# Patient Record
Sex: FEMALE | Race: WHITE | NOT HISPANIC OR LATINO | Employment: FULL TIME | URBAN - METROPOLITAN AREA
[De-identification: names, ages, dates, MRNs, and addresses within clinical notes are randomized per-mention and may not be internally consistent; named-entity substitution may affect disease eponyms.]

---

## 2021-05-11 ENCOUNTER — APPOINTMENT (OUTPATIENT)
Dept: RADIOLOGY | Facility: CLINIC | Age: 21
End: 2021-05-11
Payer: COMMERCIAL

## 2021-05-11 ENCOUNTER — OFFICE VISIT (OUTPATIENT)
Dept: URGENT CARE | Facility: CLINIC | Age: 21
End: 2021-05-11
Payer: COMMERCIAL

## 2021-05-11 VITALS
TEMPERATURE: 97.8 F | HEIGHT: 66 IN | RESPIRATION RATE: 18 BRPM | WEIGHT: 136 LBS | OXYGEN SATURATION: 98 % | DIASTOLIC BLOOD PRESSURE: 57 MMHG | BODY MASS INDEX: 21.86 KG/M2 | SYSTOLIC BLOOD PRESSURE: 113 MMHG | HEART RATE: 73 BPM

## 2021-05-11 DIAGNOSIS — S99.921A FOOT INJURY, RIGHT, INITIAL ENCOUNTER: ICD-10-CM

## 2021-05-11 DIAGNOSIS — M77.41 METATARSALGIA OF RIGHT FOOT: Primary | ICD-10-CM

## 2021-05-11 PROCEDURE — 73630 X-RAY EXAM OF FOOT: CPT

## 2021-05-11 PROCEDURE — 99204 OFFICE O/P NEW MOD 45 MIN: CPT | Performed by: PHYSICIAN ASSISTANT

## 2021-05-11 NOTE — PROGRESS NOTES
[facility]        NAME: Sandi Bacon is a 19 y o  female  : 2000    MRN: [id number]  DATE: May 11, 2021  TIME: 4:13 PM    Assessment and Plan   Metatarsalgia of right foot [M77 41]  1  Metatarsalgia of right foot  XR foot 3+ vw right    Ambulatory referral to Podiatry         Patient Instructions      follow-up with Podiatry as needed  Follow up with PCP in 3-5 days  Proceed to  ER if symptoms worsen  Chief Complaint     Chief Complaint   Patient presents with    Foot Pain     Has a previous Broken middle toe that she feels she broke multiple times in the past year and know having pain in the [place] and padof her foot           History of Present Illness        64-year-old female presents with right foot pain  Patient reports she has been having discomfort for the past several days  No trauma his reported recently  Reports that she is having lot of pain on the plantar aspect of her foot down by her toes  Denies any swelling or erythema  Previous history of broken toes the past year  Ankle Pain   The incident occurred 3 to 5 days ago  The incident occurred at home  There was no injury mechanism  The pain is present in the right foot  The quality of the pain is described as aching  The pain is mild  The pain has been fluctuating since onset  Pertinent negatives include no inability to bear weight, loss of motion, loss of sensation, muscle weakness, numbness or tingling  She reports no foreign bodies present  The symptoms are aggravated by palpation and weight bearing  She has tried nothing for the symptoms  The treatment provided no relief  Review of Systems   Review of Systems   Constitutional: Negative  Respiratory: Negative  Cardiovascular: Negative  Gastrointestinal: Negative  Musculoskeletal: Positive for arthralgias  Skin: Negative  Neurological: Negative  Negative for tingling and numbness           Current Medications     No current outpatient medications on file     Current Allergies     Allergies as of 05/11/2021    (No Known Allergies)            The following portions of the patient's history were reviewed and updated as appropriate: allergies, current medications, past family history, past medical history, past social history, past surgical history and problem list      Past Medical History:   Diagnosis Date    Allergic     Seasonal     MTHFR gene mutation        Past Surgical History:   Procedure Laterality Date    WISDOM TOOTH EXTRACTION         Family History   Problem Relation Age of Onset    Motor neuron disease Mother     No Known Problems Father          Medications have been verified  Objective   /57   Pulse 73   Temp 97 8 °F (36 6 °C)   Resp 18   Ht 5' 6" (1 676 m)   Wt 61 7 kg (136 lb)   SpO2 98%   BMI 21 95 kg/m²   No LMP recorded  Physical Exam     Physical Exam  Vitals signs and nursing note reviewed  Constitutional:       General: She is not in acute distress  Appearance: She is well-developed  HENT:      Head: Normocephalic and atraumatic  Right Ear: External ear normal       Left Ear: External ear normal       Nose: Nose normal       Mouth/Throat:      Pharynx: No oropharyngeal exudate  Eyes:      General:         Right eye: No discharge  Left eye: No discharge  Conjunctiva/sclera: Conjunctivae normal    Neck:      Musculoskeletal: Normal range of motion and neck supple  Pulmonary:      Effort: Pulmonary effort is normal  No respiratory distress  Musculoskeletal: Normal range of motion  Right foot: Normal range of motion and normal capillary refill  Tenderness and bony tenderness present  No swelling, crepitus or deformity  Feet:    Skin:     General: Skin is warm and dry  Neurological:      Mental Status: She is alert and oriented to person, place, and time  x-rays reviewed  No fractures or abnormalities noted    Pending radiologist interpretation

## 2021-05-11 NOTE — PATIENT INSTRUCTIONS
Motrin and/or Tylenol as needed for pain control   follow-up with Podiatry as needed  Follow up with PCP in 3-5 days  Proceed to  ER if symptoms worsen  Metatarsalgia   AMBULATORY CARE:   Metatarsalgia  is pain in the ball of your foot, near your second, third, and fourth toes  Common signs and symptoms of metatarsalgia:  Symptoms usually develop over time, but you may have sudden pain from an injury  You may have any of the following:  · Pain at the ball of your foot or near your toes that gets worse when you walk or stand, especially on hard surfaces    · Pain during exercises such as running    · Sharp or shooting pain in your toes that may get worse when you flex your toes    · Tingling or numbness in your toes    · Feeling like you are walking over rocks, or that you have a bruise    · A change in the way you walk because you try to avoid putting pressure on the ball of your foot    Contact your healthcare provider if:   · You develop knee, back, or hip pain  · You have more pain or redness in the foot  · You have questions or concerns about your condition or care  Treatment:  The cause of your metatarsalgia will be treated, if possible  You may also need any of the following:  · NSAIDs , such as ibuprofen, help decrease swelling, pain, and fever  This medicine is available with or without a doctor's order  NSAIDs can cause stomach bleeding or kidney problems in certain people  If you take blood thinner medicine, always ask if NSAIDs are safe for you  Always read the medicine label and follow directions  Do not give these medicines to children under 10months of age without direction from your child's healthcare provider  · Ultrasound  may be used to relieve your pain  Sound waves from the ultrasound can help send heat deeper into your tissues  · A steroid injection  may help decrease inflammation  · Surgery  may be needed if other treatments do not work   Surgery is used to align the bones near your toes  You may also need surgery to fix a problem such as hammertoe  Manage or prevent metatarsalgia:   · Rest your foot  If you play sports, you may not be able to do weight-bearing exercises  Examples include swimming and bike riding  Ask your healthcare provider which exercises are safe for you  · Apply ice as directed  Ice helps reduce pain and swelling  Use an ice pack, or put crushed ice in a plastic bag  Cover the pack or bag with a towel before you apply it to your foot  Apply ice for 15 to 20 minutes every hour, or as directed  · Use a cane or crutch if directed  These devices may help take pressure off your foot while it heals  · Wear proper shoes  Do not wear shoes that are narrow or tight  You may need to wear shoes that are wider than you usually wear  Choose shoes that do not have a raised heel  Shock-absorbing shoes can help prevent injury  These shoes will have extra support under your feet and toes  You can also add shoe cushions inside your shoes or to the bottoms of your feet, near your toes  The cushions may provide more support and make walking or standing more comfortable  Arch supports may help take pressure off your toes  · Reach or maintain a healthy weight  Extra weight can put pressure on your feet  Talk to your healthcare provider about a healthy weight for you  Your provider can help you create a safe weight loss plan if you are overweight  · Go to physical therapy if directed  A physical therapist can help improve your strength and range of motion  The therapist can also help you improve the way you walk to prevent metatarsalgia from happening again  Your therapist can also teach you exercises to help relieve your pain  Follow up with your healthcare provider as directed:  Write down your questions so you remember to ask them during your visits     © Copyright [facility] [address] Information is for End User's use only and may not be sold, redistributed or otherwise used for commercial purposes  All illustrations and images included in CareNotes® are the copyrighted property of A D A M , Inc  or Brian Urias  The above information is an  only  It is not intended as medical advice for individual conditions or treatments  Talk to your doctor, nurse or pharmacist before following any medical regimen to see if it is safe and effective for you

## 2021-06-03 ENCOUNTER — OFFICE VISIT (OUTPATIENT)
Dept: PODIATRY | Facility: CLINIC | Age: 21
End: 2021-06-03
Payer: COMMERCIAL

## 2021-06-03 VITALS
WEIGHT: 135 LBS | HEIGHT: 66 IN | HEART RATE: 84 BPM | DIASTOLIC BLOOD PRESSURE: 71 MMHG | SYSTOLIC BLOOD PRESSURE: 117 MMHG | BODY MASS INDEX: 21.69 KG/M2

## 2021-06-03 DIAGNOSIS — G57.61 LESION OF RIGHT PLANTAR NERVE: Primary | ICD-10-CM

## 2021-06-03 DIAGNOSIS — R60.0 LOCALIZED EDEMA: ICD-10-CM

## 2021-06-03 DIAGNOSIS — M77.41 METATARSALGIA OF RIGHT FOOT: ICD-10-CM

## 2021-06-03 PROCEDURE — 64455 NJX AA&/STRD PLTR COM DG NRV: CPT | Performed by: PODIATRIST

## 2021-06-03 PROCEDURE — 99202 OFFICE O/P NEW SF 15 MIN: CPT | Performed by: PODIATRIST

## 2021-06-03 RX ORDER — MELOXICAM 15 MG/1
15 TABLET ORAL DAILY
Qty: 30 TABLET | Refills: 0 | Status: SHIPPED | OUTPATIENT
Start: 2021-06-03 | End: 2021-07-22

## 2021-06-03 RX ORDER — LIDOCAINE HYDROCHLORIDE 10 MG/ML
1 INJECTION, SOLUTION EPIDURAL; INFILTRATION; INTRACAUDAL; PERINEURAL ONCE
Status: COMPLETED | OUTPATIENT
Start: 2021-06-03 | End: 2021-06-03

## 2021-06-03 RX ORDER — TRIAMCINOLONE ACETONIDE 40 MG/ML
20 INJECTION, SUSPENSION INTRA-ARTICULAR; INTRAMUSCULAR ONCE
Status: COMPLETED | OUTPATIENT
Start: 2021-06-03 | End: 2021-06-03

## 2021-06-03 RX ORDER — LEVOCETIRIZINE DIHYDROCHLORIDE 5 MG/1
TABLET, FILM COATED ORAL
COMMUNITY

## 2021-06-03 RX ADMIN — LIDOCAINE HYDROCHLORIDE 1 ML: 10 INJECTION, SOLUTION EPIDURAL; INFILTRATION; INTRACAUDAL; PERINEURAL at 16:01

## 2021-06-03 RX ADMIN — TRIAMCINOLONE ACETONIDE 20 MG: 40 INJECTION, SUSPENSION INTRA-ARTICULAR; INTRAMUSCULAR at 16:02

## 2021-06-03 NOTE — PROGRESS NOTES
Assessment/Plan:      Explained to patient that she is dealing with metatarsalgia of the right foot due to possible neuroma  Unable to rule out joint pain secondary to contusion that caused her 3rd toe fracture in the past     Injected 3rd metatarsal interspace right foot with 0 5 cc Kenalog 40 along with 1 cc 1% xylocaine  Patient placed on meloxicam 15 mg daily  In the future, recommend cortisone injection at base of right 3rd toe to try to reduce the edema  No problem-specific Assessment & Plan notes found for this encounter  Diagnoses and all orders for this visit:    Lesion of right plantar nerve  -     lidocaine (PF) (XYLOCAINE-MPF) 1 % injection 1 mL  -     triamcinolone acetonide (KENALOG-40) 40 mg/mL injection 20 mg    Metatarsalgia of right foot  -     Ambulatory referral to Podiatry  -     meloxicam (MOBIC) 15 mg tablet; Take 1 tablet (15 mg total) by mouth daily    Localized edema    Other orders  -     levocetirizine (XYZAL) 5 MG tablet          Subjective:      Patient ID: Suhail Russo is a 19 y o  female  HPI       Patient, a 57-year-old female in good health presents with right forefoot pain   Along with significant swelling in her right 3rd toe  Patient states that she fractured her 3rd toe in September of 2020  Her medical doctor did not x-ray the digit so she does not know what bone was broken  She tried to tape the right 3rd toe to an adjacent toe but it took quite some time for the toe pain to dissipate and the edema and never decreased  Her current pain is in the right forefoot area  She relates shooting pain in the ball of the foot into the toe  She is unable to exercise due to the discomfort  Reviewed x-rays of the right foot dated 05/11/2021  They were negative for osseous pathology  There is no evidence of prior fracture of 3rd toe      The following portions of the patient's history were reviewed and updated as appropriate: allergies, current medications, past family history, past medical history, past social history, past surgical history and problem list     Review of Systems   Constitutional: Negative  Gastrointestinal: Negative  Musculoskeletal: Negative  Neurological: Negative  Objective:      /71   Pulse 84   Ht 5' 6" (1 676 m)   Wt 61 2 kg (135 lb)   BMI 21 79 kg/m²          Physical Exam  Constitutional:       Appearance: Normal appearance  Cardiovascular:      Pulses: Normal pulses  Musculoskeletal:         General: Tenderness present  Comments: Mild pain with palpation 3rd metatarsal interspace right foot and 3rd MPJ right foot  Right 3rd toe is moderately edematous compared to other toes  Skin:     General: Skin is warm  Findings: No lesion  Neurological:      General: No focal deficit present  Mental Status: She is oriented to person, place, and time  Comments: Yumi Torin sign negative at 3rd metatarsal interspace right foot  Nerve block    Date/Time: 6/3/2021 4:19 PM  Performed by: Hanna Anne DPM  Authorized by: Hanna Anne DPM     Patient location:  [facility]  [place] Protocol:  Consent: Verbal consent obtained  Risks and benefits: risks, benefits and alternatives were discussed  Patient understanding: patient states understanding of the procedure being performed  Patient identity confirmed: verbally with patient      Indications:     Indications:  Pain relief  Location:     Body area:  Lower extremity    Lower extremity nerve:  Digital    Laterality:  Right  Pre-procedure details:     Skin preparation:  Alcohol  Procedure details (see MAR for exact dosages): Block needle gauge:  25 G    Anesthetic injected:  Lidocaine 1% w/o epi    Steroid injected:  Triamcinolone    Injection procedure:  Anatomic landmarks identified  Post-procedure details:     Dressing:  None    Outcome:  Anesthesia achieved    Patient tolerance of procedure:   Tolerated well, no immediate complications  Comments: Injected 3rd metatarsal interspace right foot with 0 5 cc Kenalog 40 along with 1 cc 1% xylocaine

## 2021-06-24 ENCOUNTER — OFFICE VISIT (OUTPATIENT)
Dept: PODIATRY | Facility: CLINIC | Age: 21
End: 2021-06-24
Payer: COMMERCIAL

## 2021-06-24 VITALS
DIASTOLIC BLOOD PRESSURE: 71 MMHG | SYSTOLIC BLOOD PRESSURE: 114 MMHG | HEART RATE: 78 BPM | BODY MASS INDEX: 22.18 KG/M2 | WEIGHT: 138 LBS | HEIGHT: 66 IN

## 2021-06-24 DIAGNOSIS — M20.61 ACQUIRED DEFORMITY OF RIGHT TOE: ICD-10-CM

## 2021-06-24 DIAGNOSIS — R60.0 LOCALIZED EDEMA: Primary | ICD-10-CM

## 2021-06-24 PROCEDURE — 99212 OFFICE O/P EST SF 10 MIN: CPT | Performed by: PODIATRIST

## 2021-06-24 PROCEDURE — 20550 NJX 1 TENDON SHEATH/LIGAMENT: CPT | Performed by: PODIATRIST

## 2021-06-24 RX ORDER — TRIAMCINOLONE ACETONIDE 40 MG/ML
20 INJECTION, SUSPENSION INTRA-ARTICULAR; INTRAMUSCULAR ONCE
Status: COMPLETED | OUTPATIENT
Start: 2021-06-24 | End: 2021-06-24

## 2021-06-24 RX ORDER — LIDOCAINE HYDROCHLORIDE 10 MG/ML
1 INJECTION, SOLUTION EPIDURAL; INFILTRATION; INTRACAUDAL; PERINEURAL ONCE
Status: COMPLETED | OUTPATIENT
Start: 2021-06-24 | End: 2021-06-24

## 2021-06-24 RX ADMIN — LIDOCAINE HYDROCHLORIDE 1 ML: 10 INJECTION, SOLUTION EPIDURAL; INFILTRATION; INTRACAUDAL; PERINEURAL at 11:24

## 2021-06-24 RX ADMIN — TRIAMCINOLONE ACETONIDE 20 MG: 40 INJECTION, SUSPENSION INTRA-ARTICULAR; INTRAMUSCULAR at 11:25

## 2021-06-24 NOTE — PROGRESS NOTES
Patient presents for assessment of right foot  Patient responded well to cortisone injection at the 3rd metatarsal interspace and no longer has neuroma pain  She is still dealing with significant swelling in her right 3rd toe  This is a sequela of on right 3rd toe fracture  Recommended cortisone injection in attempt to reduce the edema  Anesthesia via 2 cc of 1% xylocaine followed by 0 5 cc Kenalog 40 at the PIPJ  The majority of the swelling is at the DIPJ  Patient will be reassessed in 4 weeks  Foot injection     Date/Time 6/24/2021 11:39 AM     Performed by  Jayda Barcenas DPM     Authorized by Jayda Barcenas DPM      Universal Protocol   Consent: Verbal consent obtained  Risks and benefits: risks, benefits and alternatives were discussed  Consent given by: patient  Patient understanding: patient states understanding of the procedure being performed  Patient identity confirmed: verbally with patient        Local anesthesia used: yes     Anesthesia   Local anesthesia used: yes  Local Anesthetic: lidocaine 1% without epinephrine     Procedure Details   Procedure Notes:   Injected right 3rd toe with 0 5 cc Kenalog 40 along with 2 cc 1% xylocaine

## 2021-07-22 ENCOUNTER — OFFICE VISIT (OUTPATIENT)
Dept: PODIATRY | Facility: CLINIC | Age: 21
End: 2021-07-22
Payer: COMMERCIAL

## 2021-07-22 VITALS
SYSTOLIC BLOOD PRESSURE: 110 MMHG | HEART RATE: 76 BPM | HEIGHT: 66 IN | WEIGHT: 134.2 LBS | DIASTOLIC BLOOD PRESSURE: 72 MMHG | BODY MASS INDEX: 21.57 KG/M2

## 2021-07-22 DIAGNOSIS — R60.0 LOCALIZED EDEMA: Primary | ICD-10-CM

## 2021-07-22 PROCEDURE — 99212 OFFICE O/P EST SF 10 MIN: CPT | Performed by: PODIATRIST

## 2021-07-22 NOTE — PROGRESS NOTES
Patient presents for assessment of right 3rd toe  Patient was given a cortisone injection due to chronic edema in the digit  Digit responded well to the injection and no swelling now present  No foot discomfort related  No additional treatment is needed  Kim quiñones

## 2022-07-11 DIAGNOSIS — I83.893 VARICOSE VEINS OF BILATERAL LOWER EXTREMITIES WITH OTHER COMPLICATIONS: ICD-10-CM

## 2023-03-28 ENCOUNTER — OFFICE VISIT (OUTPATIENT)
Dept: URGENT CARE | Facility: CLINIC | Age: 23
End: 2023-03-28

## 2023-03-28 VITALS
WEIGHT: 146 LBS | OXYGEN SATURATION: 98 % | RESPIRATION RATE: 16 BRPM | DIASTOLIC BLOOD PRESSURE: 59 MMHG | BODY MASS INDEX: 23.46 KG/M2 | HEART RATE: 97 BPM | HEIGHT: 66 IN | TEMPERATURE: 98 F | SYSTOLIC BLOOD PRESSURE: 116 MMHG

## 2023-03-28 DIAGNOSIS — R21 RASH: ICD-10-CM

## 2023-03-28 DIAGNOSIS — H65.112 ACUTE MUCOID OTITIS MEDIA OF LEFT EAR: Primary | ICD-10-CM

## 2023-03-28 DIAGNOSIS — J06.9 VIRAL URI WITH COUGH: ICD-10-CM

## 2023-03-28 RX ORDER — CEFDINIR 300 MG/1
300 CAPSULE ORAL EVERY 12 HOURS SCHEDULED
Qty: 14 CAPSULE | Refills: 0 | Status: SHIPPED | OUTPATIENT
Start: 2023-03-28 | End: 2023-04-04

## 2023-03-28 RX ORDER — ACETAMINOPHEN 325 MG/1
TABLET ORAL
COMMUNITY
Start: 2022-11-18 | End: 2023-03-28

## 2023-03-28 RX ORDER — AMOXICILLIN 500 MG/1
500 CAPSULE ORAL EVERY 12 HOURS SCHEDULED
Qty: 14 CAPSULE | Refills: 0 | Status: SHIPPED | OUTPATIENT
Start: 2023-03-28 | End: 2023-03-28

## 2023-03-28 NOTE — PROGRESS NOTES
[facility]        NAME: Toni Burt is a 23 y o  female  : 2000    MRN: [id number]  DATE: 2023  TIME: 4:50 PM    Assessment and Plan   Acute mucoid otitis media of left ear [H65 112]  1  Acute mucoid otitis media of left ear  cefdinir (OMNICEF) 300 mg capsule    DISCONTINUED: amoxicillin (AMOXIL) 500 mg capsule      2  Viral URI with cough        3  Rash          Denied covid-19 testing    Patient Instructions   Antibiotic  Over-the-counter cold and flu  Drink plenty fluids  Monitor rash  Follow up with PCP in 3-5 days  Proceed to  ER if symptoms worsen  Chief Complaint     Chief Complaint   Patient presents with   • Earache     Bilateral pain starting  night   • Sinus Congestion     Starting  night; pt reports yellow mucus production   • Rash     Right forearm, pt noticed rash yesterday; denies any new exposures         History of Present Illness       Patient is a 23year old female with no PMH who presents with bilateral ear pain and sinus congestion x 3 days  Patient states symptoms began  and have worsened since starting  Patient states that she has not tried any OTC medications for her symptoms  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 any associated symptoms of headache, fever, chest tightness, chest congestion, or shortness of breath  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 any 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  Patient states she did not take any at home COVID test during this time  Patient also presents with rash noted on her right forearm  Patient states she noticed the rash a couple of days ago  Patient described the rash as small red dots on her right arm  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 any pain or itching of the rash  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 any changes to lotions, detergents, foods, or medications  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 any previous episodes of a similar rash  Review of Systems   Review of Systems   Constitutional: Negative for chills and fever  HENT: Positive for congestion and ear pain  "Negative for sore throat  Respiratory: Negative for cough and shortness of breath  Cardiovascular: Negative for chest pain and palpitations  Gastrointestinal: Negative for abdominal pain, diarrhea, nausea and vomiting  Skin: Positive for rash  Neurological: Negative for dizziness, light-headedness and headaches  Current Medications       Current Outpatient Medications:   •  cefdinir (OMNICEF) 300 mg capsule, Take 1 capsule (300 mg total) by mouth every 12 (twelve) hours for 7 days, Disp: 14 capsule, Rfl: 0  •  Cetirizine HCl (ZYRTEC PO), Take by mouth, Disp: , Rfl:   •  cyanocobalamin (VITAMIN B-12) 1000 MCG tablet, Take 1,000 mcg by mouth, Disp: , Rfl:   •  Loratadine (CLARITIN PO), Take by mouth, Disp: , Rfl:     Current Allergies     Allergies as of 03/28/2023 - Reviewed 03/28/2023   Allergen Reaction Noted   • Blue dyes (parenteral) - food allergy Itching and Rash 11/18/2022   • Amoxicillin Rash 03/28/2023            The following portions of the patient's history were reviewed and updated as appropriate: allergies, current medications, past family history, past medical history, past social history, past surgical history and problem list      Past Medical History:   Diagnosis Date   • Allergic     Seasonal    • MTHFR gene mutation        Past Surgical History:   Procedure Laterality Date   • VARICOSE VEIN SURGERY     • WISDOM TOOTH EXTRACTION         Family History   Problem Relation Age of Onset   • Other Mother         MTHFR   • No Known Problems Father          Medications have been verified  Objective   /59   Pulse 97   Temp 98 °F (36 7 °C)   Resp 16   Ht 5' 6\" (1 676 m)   Wt 66 2 kg (146 lb)   LMP 03/09/2023   SpO2 98%   BMI 23 57 kg/m²   Patient's last menstrual period was 03/09/2023  Physical Exam     Physical Exam  Vitals and nursing note reviewed  Constitutional:       Appearance: Normal appearance  She is well-developed     HENT:      Head: Normocephalic " and atraumatic  Right Ear: Ear canal and external ear normal  A middle ear effusion is present  Tympanic membrane is erythematous and bulging  Left Ear: Tympanic membrane, ear canal and external ear normal       Nose: Nose normal       Mouth/Throat:      Pharynx: Uvula midline  Eyes:      General: Lids are normal       Conjunctiva/sclera: Conjunctivae normal       Pupils: Pupils are equal, round, and reactive to light  Cardiovascular:      Rate and Rhythm: Normal rate and regular rhythm  Pulses: Normal pulses  Heart sounds: Normal heart sounds  No murmur heard  No friction rub  No gallop  Pulmonary:      Effort: Pulmonary effort is normal       Breath sounds: Normal breath sounds  No wheezing, rhonchi or rales  Musculoskeletal:         General: Normal range of motion  Cervical back: Neck supple  Lymphadenopathy:      Cervical: No cervical adenopathy  Skin:     General: Skin is warm and dry  Capillary Refill: Capillary refill takes less than 2 seconds  Findings: Rash (right forearm  see image ) present  Neurological:      Mental Status: She is alert  impaired motor control/pain/impaired postural control

## 2023-07-23 ENCOUNTER — OFFICE VISIT (OUTPATIENT)
Dept: URGENT CARE | Facility: CLINIC | Age: 23
End: 2023-07-23
Payer: COMMERCIAL

## 2023-07-23 ENCOUNTER — APPOINTMENT (OUTPATIENT)
Dept: RADIOLOGY | Facility: CLINIC | Age: 23
End: 2023-07-23
Payer: COMMERCIAL

## 2023-07-23 VITALS
BODY MASS INDEX: 23.78 KG/M2 | SYSTOLIC BLOOD PRESSURE: 131 MMHG | HEIGHT: 66 IN | HEART RATE: 87 BPM | TEMPERATURE: 97.4 F | OXYGEN SATURATION: 98 % | DIASTOLIC BLOOD PRESSURE: 62 MMHG | WEIGHT: 148 LBS | RESPIRATION RATE: 18 BRPM

## 2023-07-23 DIAGNOSIS — S63.641A SPRAIN OF METACARPOPHALANGEAL (MCP) JOINT OF RIGHT THUMB, INITIAL ENCOUNTER: ICD-10-CM

## 2023-07-23 DIAGNOSIS — S63.641A SPRAIN OF METACARPOPHALANGEAL (MCP) JOINT OF RIGHT THUMB, INITIAL ENCOUNTER: Primary | ICD-10-CM

## 2023-07-23 PROCEDURE — 73130 X-RAY EXAM OF HAND: CPT

## 2023-07-23 PROCEDURE — 99213 OFFICE O/P EST LOW 20 MIN: CPT | Performed by: PHYSICIAN ASSISTANT

## 2023-07-23 RX ORDER — ESCITALOPRAM OXALATE 10 MG/1
10 TABLET ORAL DAILY
COMMUNITY

## 2023-07-23 NOTE — PROGRESS NOTES
[facility]        NAME: Denise Ordonez is a 20 y.o. female  : 2000    MRN: [id number]  DATE: 2023  TIME: 3:02 PM    Assessment and Plan   Sprain of metacarpophalangeal (MCP) joint of right thumb, initial encounter [S63.641A]  1. Sprain of metacarpophalangeal (MCP) joint of right thumb, initial encounter  XR hand 3+ vw right    Ambulatory Referral to Physical Therapy    Ambulatory Referral to Orthopedic Surgery    Orthopedic injury treatment            Patient Instructions   Thumb brace. Tylenol ibuprofen. Ice. Gentle range of motion stretching. Physical therapy. Orthopedics. Follow up with PCP in 3-5 days. Proceed to  ER if symptoms worsen. Chief Complaint     Chief Complaint   Patient presents with   • Thumb Pain     Injured thumb on vacation on Tuesday. Has pain radiation into muscle and up arm          History of Present Illness       Patient is a 58-year-old female with no significant past medical history presents the office complaining of right thumb pain for almost 1 week. Reports she was at the beach and hyperextended her thumb by accident. Reports she had pain, swelling, and ecchymosis over the thenar eminence which has improved but continues. Pain today is rated 7 out of 10 located over the thenar eminence which is worse with thumb hyperextension. Review of Systems   Review of Systems   Musculoskeletal: Positive for arthralgias and joint swelling. Skin: Positive for color change.          Current Medications       Current Outpatient Medications:   •  Cetirizine HCl (ZYRTEC PO), Take by mouth, Disp: , Rfl:   •  cyanocobalamin (VITAMIN B-12) 1000 MCG tablet, Take 1,000 mcg by mouth, Disp: , Rfl:   •  escitalopram (LEXAPRO) 10 mg tablet, Take 10 mg by mouth daily, Disp: , Rfl:   •  Loratadine (CLARITIN PO), Take by mouth, Disp: , Rfl:     Current Allergies     Allergies as of 2023 - Reviewed 2023   Allergen Reaction Noted   • Blue dyes (parenteral) - food allergy Itching and Rash 11/18/2022   • Amoxicillin Rash 03/28/2023            The following portions of the patient's history were reviewed and updated as appropriate: allergies, current medications, past family history, past medical history, past social history, past surgical history and problem list.     Past Medical History:   Diagnosis Date   • Allergic     Seasonal    • Anxiety    • MTHFR gene mutation        Past Surgical History:   Procedure Laterality Date   • VARICOSE VEIN SURGERY     • WISDOM TOOTH EXTRACTION         Family History   Problem Relation Age of Onset   • Other Mother         MTHFR   • No Known Problems Father          Medications have been verified. Objective   /62   Pulse 87   Temp (!) 97.4 °F (36.3 °C)   Resp 18   Ht 5' 6" (1.676 m)   Wt 67.1 kg (148 lb)   SpO2 98%   BMI 23.89 kg/m²   No LMP recorded. Physical Exam     Physical Exam  Vitals and nursing note reviewed. Constitutional:       Appearance: She is well-developed. HENT:      Head: Normocephalic and atraumatic. Right Ear: External ear normal.      Left Ear: External ear normal.      Nose: Nose normal.   Eyes:      General: Lids are normal.      Conjunctiva/sclera: Conjunctivae normal.   Musculoskeletal:      Right wrist: No swelling, bony tenderness or snuff box tenderness. Normal range of motion. Normal pulse. Right hand: Bony tenderness (1st mtp and thenar eminence) present. No swelling. Normal range of motion. Normal strength. Normal sensation. There is no disruption of two-point discrimination. Normal capillary refill. Skin:     General: Skin is warm and dry. Capillary Refill: Capillary refill takes less than 2 seconds. Findings: No rash. Neurological:      Mental Status: She is alert. Right hand x-ray: No evidence of acute osseous abnormalities. Radiology interpretation pending.     Orthopedic injury treatment    Date/Time: 7/23/2023 2:50 PM    Performed by: Sumit Nunez PA-C  Authorized by: Sumit Nunez PA-C    Patient Location:  Bedside  [place] Protocol:  Consent: Verbal consent obtained. Risks and benefits: risks, benefits and alternatives were discussed  Consent given by: patient  Patient understanding: patient states understanding of the procedure being performed  Patient consent: the patient's understanding of the procedure matches consent given  Patient identity confirmed: verbally with patient      Injury location:  Hand  Location details:  Right hand  Injury type:   Soft tissue  Neurovascular status: Neurovascularly intact    Distal perfusion: normal    Neurological function: normal    Range of motion: normal    Immobilization:  Other (comment) (pre made thumb spica)  Neurovascular status: Neurovascularly intact    Distal perfusion: normal    Neurological function: normal    Range of motion: unchanged    Patient tolerance:  Patient tolerated the procedure well with no immediate complications

## 2023-07-23 NOTE — PATIENT INSTRUCTIONS
Thumb brace. Tylenol ibuprofen. Ice. Gentle range of motion stretching. Physical therapy. Orthopedics.

## 2023-07-25 ENCOUNTER — TELEPHONE (OUTPATIENT)
Dept: OBGYN CLINIC | Facility: OTHER | Age: 23
End: 2023-07-25

## 2023-07-25 NOTE — TELEPHONE ENCOUNTER
Patient is being referred to a orthopedics. Please schedule accordingly.     [address] [facility]   [phone]

## 2024-01-26 ENCOUNTER — HOSPITAL ENCOUNTER (OUTPATIENT)
Dept: ULTRASOUND IMAGING | Facility: HOSPITAL | Age: 24
End: 2024-01-26
Payer: COMMERCIAL

## 2024-01-26 DIAGNOSIS — E04.1 NONTOXIC SINGLE THYROID NODULE: ICD-10-CM

## 2024-01-26 PROCEDURE — 76536 US EXAM OF HEAD AND NECK: CPT
